# Patient Record
Sex: FEMALE | Race: WHITE | NOT HISPANIC OR LATINO | Employment: FULL TIME | URBAN - METROPOLITAN AREA
[De-identification: names, ages, dates, MRNs, and addresses within clinical notes are randomized per-mention and may not be internally consistent; named-entity substitution may affect disease eponyms.]

---

## 2017-05-23 ENCOUNTER — HOSPITAL ENCOUNTER (OUTPATIENT)
Dept: RADIOLOGY | Facility: MEDICAL CENTER | Age: 57
End: 2017-05-23
Attending: OBSTETRICS & GYNECOLOGY
Payer: COMMERCIAL

## 2017-05-23 DIAGNOSIS — Z12.31 VISIT FOR SCREENING MAMMOGRAM: ICD-10-CM

## 2017-05-23 PROCEDURE — G0202 SCR MAMMO BI INCL CAD: HCPCS

## 2017-05-25 ENCOUNTER — TELEPHONE (OUTPATIENT)
Dept: RADIOLOGY | Facility: MEDICAL CENTER | Age: 57
End: 2017-05-25

## 2017-06-16 ENCOUNTER — HOSPITAL ENCOUNTER (OUTPATIENT)
Dept: RADIOLOGY | Facility: MEDICAL CENTER | Age: 57
End: 2017-06-16
Attending: OBSTETRICS & GYNECOLOGY
Payer: COMMERCIAL

## 2017-06-16 ENCOUNTER — NON-PROVIDER VISIT (OUTPATIENT)
Dept: CARDIOLOGY | Facility: MEDICAL CENTER | Age: 57
End: 2017-06-16
Attending: FAMILY MEDICINE
Payer: COMMERCIAL

## 2017-06-16 VITALS
BODY MASS INDEX: 34.15 KG/M2 | DIASTOLIC BLOOD PRESSURE: 90 MMHG | OXYGEN SATURATION: 94 % | HEART RATE: 73 BPM | SYSTOLIC BLOOD PRESSURE: 120 MMHG | HEIGHT: 64 IN | WEIGHT: 200 LBS

## 2017-06-16 DIAGNOSIS — R53.83 FATIGUE, UNSPECIFIED TYPE: ICD-10-CM

## 2017-06-16 DIAGNOSIS — Z82.49 FAMILY HISTORY OF CARDIOVASCULAR DISEASE: ICD-10-CM

## 2017-06-16 DIAGNOSIS — Z13.6 SCREENING FOR ISCHEMIC HEART DISEASE: ICD-10-CM

## 2017-06-16 DIAGNOSIS — R92.8 MAMMOGRAM ABNORMAL: ICD-10-CM

## 2017-06-16 LAB — TREADMILL STRESS: NORMAL

## 2017-06-16 PROCEDURE — G0206 DX MAMMO INCL CAD UNI: HCPCS | Mod: LT

## 2017-06-16 PROCEDURE — 93015 CV STRESS TEST SUPVJ I&R: CPT | Performed by: INTERNAL MEDICINE

## 2017-06-16 PROCEDURE — 76642 ULTRASOUND BREAST LIMITED: CPT | Mod: LT

## 2017-11-06 ENCOUNTER — TELEPHONE (OUTPATIENT)
Dept: CARDIOLOGY | Facility: MEDICAL CENTER | Age: 57
End: 2017-11-06

## 2017-11-06 NOTE — TELEPHONE ENCOUNTER
Spoke with patient, no recent testing; treadmill done in June and EKG done and in chart; advised to bring medicine and ID and insurance cards to appointment; patient thankful for the phone call.

## 2017-11-08 ENCOUNTER — OFFICE VISIT (OUTPATIENT)
Dept: CARDIOLOGY | Facility: MEDICAL CENTER | Age: 57
End: 2017-11-08
Payer: COMMERCIAL

## 2017-11-08 VITALS
BODY MASS INDEX: 32.78 KG/M2 | SYSTOLIC BLOOD PRESSURE: 126 MMHG | WEIGHT: 192 LBS | DIASTOLIC BLOOD PRESSURE: 80 MMHG | HEIGHT: 64 IN | HEART RATE: 80 BPM | OXYGEN SATURATION: 93 %

## 2017-11-08 DIAGNOSIS — I51.89 DIASTOLIC DYSFUNCTION: ICD-10-CM

## 2017-11-08 DIAGNOSIS — R53.83 FATIGUE, UNSPECIFIED TYPE: ICD-10-CM

## 2017-11-08 PROCEDURE — 99244 OFF/OP CNSLTJ NEW/EST MOD 40: CPT | Performed by: INTERNAL MEDICINE

## 2017-11-08 NOTE — PROGRESS NOTES
Subjective:   Ruthann Lynch is a 57 y.o. female who presents today For consultation regarding fatigue. She has a history of sudden cardiac death in her father and brother. Her brother was in his 40s and her father was in his 70s. She had atypical chest pain culminating in a normal coronary angiogram in 2005 by Dr. Woodall. She complains mostly of generalized fatigue over the past couple of years associated with weight gain. She has no cardiovascular symptoms. She underwent a treadmill stress test with her primary physician that was normal. She achieved good exercise capacity of 10.1 METs. She has no palpitations or other problems. Years ago she saw one of our partners who told her she had diastolic heart failure. She has never in fact had a syndrome of heart failure but did have diastolic dysfunction, grade 2, on her echocardiogram.    Denies any other cardiovascular symptoms including chest pain, shortness of breath, dyspnea on exertion, lightheadedness, syncope or presyncope, lower extremity edema, PND, orthopnea or palpitations.      Past Medical History:   Diagnosis Date   • GERD (gastroesophageal reflux disease)      Past Surgical History:   Procedure Laterality Date   • APPENDECTOMY LAPAROSCOPIC  6/12/08    Performed by NORMA PANTOJA at SURGERY SAME DAY [facility] ORS   • BREAST BIOPSY      hx of benign right breast bx   • LUMPECTOMY     • TONSILLECTOMY       Family History   Problem Relation Age of Onset   • Cancer Mother      History   Smoking Status   • Never Smoker   Smokeless Tobacco   • Never Used     No Known Allergies  Outpatient Encounter Prescriptions as of 11/8/2017   Medication Sig Dispense Refill   • omeprazole (PRILOSEC) 20 MG CPDR Take 20 mg by mouth as needed.       • doxycycline (VIBRAMYCIN) 100 MG TABS Take 1 Tab by mouth 2 times a day. 20 Tab 0   • hydrocod polst-chlorphen polst (TUSSIONEX PENNKINETIC ER) 10-8 MG/5ML LQCR Take 5 mL by mouth every 12 hours. 120 mL 0     No  "facility-administered encounter medications on file as of 11/8/2017.      Review of Systems   All other systems reviewed and are negative.       Objective:   /80   Pulse 80   Ht 1.626 m (5' 4\")   Wt 87.1 kg (192 lb)   SpO2 93%   BMI 32.96 kg/m²     Physical Exam   Constitutional: She is oriented to person, place, and time. She appears well-developed and well-nourished. No distress.   Overweight   HENT:   Head: Normocephalic and atraumatic.   Mouth/Throat: Oropharynx is clear and moist. No oropharyngeal exudate.   Eyes: Conjunctivae are normal. Pupils are equal, round, and reactive to light. No scleral icterus.   Neck: Normal range of motion. Neck supple. No JVD present. No thyromegaly present.   Cardiovascular: Normal rate, regular rhythm, normal heart sounds and intact distal pulses.  Exam reveals no gallop and no friction rub.    No murmur heard.  Pulses:       Carotid pulses are 2+ on the right side, and 2+ on the left side.       Radial pulses are 2+ on the right side, and 2+ on the left side.        Popliteal pulses are 2+ on the right side, and 2+ on the left side.        Dorsalis pedis pulses are 2+ on the right side, and 2+ on the left side.        Posterior tibial pulses are 2+ on the right side, and 2+ on the left side.   Pulmonary/Chest: Effort normal and breath sounds normal. She has no wheezes. She has no rales.   Abdominal: Soft. Bowel sounds are normal. She exhibits no distension. There is no tenderness.   Musculoskeletal: She exhibits no edema or tenderness.   Neurological: She is alert and oriented to person, place, and time. No cranial nerve deficit (Cranial nerves II through XII grossly intact).   Skin: Skin is warm and dry. No rash noted. She is not diaphoretic. No erythema.   Psychiatric: She has a normal mood and affect. Her behavior is normal.   Vitals reviewed.    STRESS (6/17/2017):  Adequate exercise tolerance and heart rate response. No ischemic  changes seen.  PAC's " seen  Negative stress test for ischemia      Assessment:     1. Diastolic dysfunction  ECHOCARDIOGRAM COMP W/O CONT    OVERNIGHT PULSE OXIMETRY    LIPID PROFILE    BTYPE NATRIURETIC PEPTIDE   2. Fatigue, unspecified type  ECHOCARDIOGRAM COMP W/O CONT    OVERNIGHT PULSE OXIMETRY    COMP METABOLIC PANEL    BTYPE NATRIURETIC PEPTIDE    TSH       Medical Decision Making:  Today's Assessment / Status / Plan:     She has no cardiovascular symptoms. She has a family history of sudden death of unclear etiology. Her stress treadmill showed an excellent functional capacity and was normal. She is asymptomatic PACs. She does note that her fatigue is, over the past year or 2 with weight gain and she does not sleep well. She may have developed sleep apnea. We discussed that the grading for diastolic function has changed and she may no longer qualifies having it, and she has never per her report or conversation had the syndrome of heart failure despite what Dr. Farias had told her in the past.    1. Echocardiogram  2. Overnight oximetry  3. Labs including TSH    Should the above be unremarkable recommend routine follow-up as needed only.    Thank you for this interesting consultation. It was my pleasure to see Ruthann Lynch today.    Jatinder Davis MD, FACC, [facility]  Division of Interventional Cardiology  [facility]

## 2017-11-09 ENCOUNTER — TELEPHONE (OUTPATIENT)
Dept: CARDIOLOGY | Facility: MEDICAL CENTER | Age: 57
End: 2017-11-09

## 2017-11-20 ENCOUNTER — HOSPITAL ENCOUNTER (OUTPATIENT)
Dept: CARDIOLOGY | Facility: MEDICAL CENTER | Age: 57
End: 2017-11-20
Attending: INTERNAL MEDICINE
Payer: COMMERCIAL

## 2017-11-20 DIAGNOSIS — I51.89 DIASTOLIC DYSFUNCTION: ICD-10-CM

## 2017-11-20 DIAGNOSIS — R53.83 FATIGUE, UNSPECIFIED TYPE: ICD-10-CM

## 2017-11-20 PROCEDURE — 93306 TTE W/DOPPLER COMPLETE: CPT | Mod: 26 | Performed by: INTERNAL MEDICINE

## 2017-11-20 PROCEDURE — 93306 TTE W/DOPPLER COMPLETE: CPT

## 2017-11-21 ENCOUNTER — TELEPHONE (OUTPATIENT)
Dept: CARDIOLOGY | Facility: MEDICAL CENTER | Age: 57
End: 2017-11-21

## 2017-11-21 DIAGNOSIS — I51.89 DIASTOLIC DYSFUNCTION: ICD-10-CM

## 2017-11-21 DIAGNOSIS — R53.83 FATIGUE, UNSPECIFIED TYPE: ICD-10-CM

## 2017-11-21 LAB
LV EJECT FRACT  99904: 75
LV EJECT FRACT MOD 2C 99903: 75.88
LV EJECT FRACT MOD 4C 99902: 77.45
LV EJECT FRACT MOD BP 99901: 77.5

## 2017-11-21 NOTE — TELEPHONE ENCOUNTER
New referral placed.     referral to Sleep Studies    Message     The referral has been faxed to scheduling      [phone]       ----- Message -----  From: Myranda Persaud  Sent: 11/20/2017   4:22 PM  To: Ashia Tobin R.N.  Subject: Sleep Study Referral                             This patient saw TW and he told her that she needed a sleep study done. There is a referral in her chart from Dr. Mack but she needs a new one.    Thank you

## 2017-11-22 ENCOUNTER — TELEPHONE (OUTPATIENT)
Dept: CARDIOLOGY | Facility: MEDICAL CENTER | Age: 57
End: 2017-11-22

## 2017-11-22 NOTE — TELEPHONE ENCOUNTER
"Pt notified of Echo results as reviewed by Dr. Davis. Questions answered and informed that per TW, \"she no longer meets criteria for having clear-cut diastolic dysfunction\". Pt very appreciative of information. She will call to FU if any issues.     -----------------------------  Echocardiography Laboratory    CONCLUSIONS  Prior Echo - 1/2/13.  Normal left ventricular size, thickness, systolic function.  Left ventricular ejection fraction is visually estimated to be 75%.  Normal regional wall motion.  No significant valve abnormalities.   Right heart pressures are normal.   Compared to the images of the prior study done -  there has been no   significant change.     "

## 2017-11-23 LAB
ALBUMIN SERPL-MCNC: 4.2 G/DL (ref 3.5–5.5)
ALBUMIN/GLOB SERPL: 1.9 {RATIO} (ref 1.2–2.2)
ALP SERPL-CCNC: 79 IU/L (ref 39–117)
ALT SERPL-CCNC: 13 IU/L (ref 0–32)
AST SERPL-CCNC: 16 IU/L (ref 0–40)
BILIRUB SERPL-MCNC: 0.4 MG/DL (ref 0–1.2)
BNP SERPL-MCNC: 29.5 PG/ML (ref 0–100)
BUN SERPL-MCNC: 14 MG/DL (ref 6–24)
BUN/CREAT SERPL: 15 (ref 9–23)
CALCIUM SERPL-MCNC: 9.3 MG/DL (ref 8.7–10.2)
CHLORIDE SERPL-SCNC: 104 MMOL/L (ref 96–106)
CHOLEST SERPL-MCNC: 244 MG/DL (ref 100–199)
CO2 SERPL-SCNC: 25 MMOL/L (ref 18–29)
COMMENT 011824: ABNORMAL
CREAT SERPL-MCNC: 0.95 MG/DL (ref 0.57–1)
GFR SERPLBLD CREATININE-BSD FMLA CKD-EPI: 66 ML/MIN/1.73
GFR SERPLBLD CREATININE-BSD FMLA CKD-EPI: 76 ML/MIN/1.73
GLOBULIN SER CALC-MCNC: 2.2 G/DL (ref 1.5–4.5)
GLUCOSE SERPL-MCNC: 92 MG/DL (ref 65–99)
HDLC SERPL-MCNC: 72 MG/DL
LDLC SERPL CALC-MCNC: 149 MG/DL (ref 0–99)
POTASSIUM SERPL-SCNC: 4.2 MMOL/L (ref 3.5–5.2)
PROT SERPL-MCNC: 6.4 G/DL (ref 6–8.5)
SODIUM SERPL-SCNC: 144 MMOL/L (ref 134–144)
TRIGL SERPL-MCNC: 114 MG/DL (ref 0–149)
TSH SERPL DL<=0.005 MIU/L-ACNC: 1.39 UIU/ML (ref 0.45–4.5)
VLDLC SERPL CALC-MCNC: 23 MG/DL (ref 5–40)

## 2017-12-06 ENCOUNTER — TELEPHONE (OUTPATIENT)
Dept: CARDIOLOGY | Facility: MEDICAL CENTER | Age: 57
End: 2017-12-06

## 2017-12-06 DIAGNOSIS — Z82.49 FAMILY HISTORY OF CARDIOVASCULAR DISEASE: ICD-10-CM

## 2017-12-06 DIAGNOSIS — E78.00 HYPERCHOLESTEREMIA: ICD-10-CM

## 2017-12-06 DIAGNOSIS — G47.30 SLEEP APNEA, UNSPECIFIED TYPE: ICD-10-CM

## 2017-12-07 NOTE — TELEPHONE ENCOUNTER
S/w pt about questions. She just wanted to FU with TSH results and wondering about where referral was sent to. Educated her that TSH is WDL and that referral was placed and sent to [facility]. Informed her to call [phone] to get scheduled. Pt appreciative of information.     Taya Griffith R.N.             [facility]       Patient spoke to you yesterday and says she has another question for you. Please call at your convenience. She can be reached at [phone].

## 2017-12-07 NOTE — TELEPHONE ENCOUNTER
Message     Referral to Pulmonology has been sent to [facility].  If the patient has not been contacted in a timely manner, have them call [phone]     Noted.

## 2017-12-07 NOTE — TELEPHONE ENCOUNTER
S/w pt, she is OK with following up with a pulmonary MD. Educated her that I will place referral for pulmonary and for her to please follow up with use if she does not hear anything in two weeks.     Pt does not want to start Atorvastatin at this time, states she would rather try to just change her diet and life-style at this time. Pt educated on diet changes and informed to please get repeat lipid panel done in 6 months. Pt states understanding and is OK with plan.     Order placed for lipid panel and referral to pulmonary. Lab slip mailed to pt.    MARIAMA WASHBURN    ----- Message from Jatinder Davis M.D. sent at 12/6/2017  2:05 PM PST -----  It appears she may indeed have significant sleep apnea which would be responsible in large part for her fatigue. I recommend also that she initiate low-dose statin therapy with atorvastatin 10 mg daily for her lipid profile in the context of her family history of heart issues.    1. Atorvastatin 10 mg daily  2. Referral to pulmonary medicine for sleep apnea

## 2017-12-19 ENCOUNTER — SLEEP CENTER VISIT (OUTPATIENT)
Dept: SLEEP MEDICINE | Facility: MEDICAL CENTER | Age: 57
End: 2017-12-19
Payer: COMMERCIAL

## 2017-12-19 VITALS
DIASTOLIC BLOOD PRESSURE: 72 MMHG | HEART RATE: 77 BPM | HEIGHT: 64 IN | SYSTOLIC BLOOD PRESSURE: 114 MMHG | RESPIRATION RATE: 16 BRPM | WEIGHT: 193 LBS | BODY MASS INDEX: 32.95 KG/M2 | TEMPERATURE: 97.4 F | OXYGEN SATURATION: 95 %

## 2017-12-19 DIAGNOSIS — G47.10 HYPERSOMNOLENCE: ICD-10-CM

## 2017-12-19 DIAGNOSIS — G47.30 SLEEP APNEA, UNSPECIFIED TYPE: ICD-10-CM

## 2017-12-19 DIAGNOSIS — F51.04 CHRONIC INSOMNIA: ICD-10-CM

## 2017-12-19 PROCEDURE — 99244 OFF/OP CNSLTJ NEW/EST MOD 40: CPT | Performed by: INTERNAL MEDICINE

## 2017-12-19 RX ORDER — VITAMIN E 268 MG
400 CAPSULE ORAL DAILY
COMMUNITY

## 2017-12-19 RX ORDER — BUPROPION HYDROCHLORIDE 150 MG/1
TABLET ORAL
Refills: 3 | COMMUNITY
Start: 2017-09-22 | End: 2017-11-01

## 2017-12-19 RX ORDER — MULTIVITAMIN WITH IRON
TABLET ORAL
COMMUNITY

## 2017-12-19 RX ORDER — TRAZODONE HYDROCHLORIDE 50 MG/1
TABLET ORAL
Refills: 1 | COMMUNITY
Start: 2017-12-04

## 2017-12-19 NOTE — PROGRESS NOTES
CC:  Snoring, non-refreshing sleep and excessive daytime somnolence, suspected sleep apnea hypopnea syndrome.    HPI:   Ms. Lynch is a 57-year-old woman referred by Dr. Jeremias Mack and Dr. Jatinder Jaimes to assist in the evaluation and management of suspected sleep-disordered breathing.    She was referred to cardiology for increasing fatigue over several years. An echocardiogram on November 20, 2017 demonstrated preserved left ventricular systolic function with normal right heart pressures. A treadmill stress test was apparently unremarkable. In the course of her evaluation, symptoms suggesting sleep-disordered breathing were identified.    She has a regular sleep schedule at bedtime at about midnight but requires 2-3 hours to fall asleep. She awakens several times on an average night, once with nocturia, and describes herself as a restless sleeper. She awakens between 8 and 10 in the morning feeling tired, groggy and unsteady on her feet but without morning headaches. She has been told that she does snore but we don't have information on possible cyclic breathing or overt apnea. She is tired and fatigued during the day. She may doze off during quiet moments, or as a passenger in a motor vehicle. She naps when the opportunity presents. She describes significant cognitive deficits and interfere with her work and leisure activities. Her Callender sleepiness score is 10 points. She drinks caffeinated beverages rarely and does not use substances to induce sleep or to maintain wakefulness other than over-the-counter Zquil. She used low-dose trazodone on 2 occasions in the distant past but found that left her groggy during the day. She has not previously been evaluated for sleep problems. She does not have symptoms suggesting narcolepsy, parasomnia or restless leg syndrome. Her father had sleep apnea hypopnea and she suspects that her brother does as well.    Nocturnal oximetry was done in the evening of November 25, 2017.  It demonstrates cyclic drops in saturation to a minimum of 77%. She spent 247 minutes with a saturation below 90%.        Patient Active Problem List    Diagnosis Date Noted   • Atypical chest pain 12/11/2012   • Abnormal ECG 12/11/2012   • GERD (gastroesophageal reflux disease) 12/11/2012   • Family history of cardiovascular disease 12/11/2012   • Obesity 12/11/2012       Past Medical History:   Diagnosis Date   • Chickenpox    • GERD (gastroesophageal reflux disease)    • Montserratian measles    • Influenza    • Mumps    • Nasal drainage    • Peptic ulcer    • Tonsillitis        Past Surgical History:   Procedure Laterality Date   • APPENDECTOMY LAPAROSCOPIC  6/12/08    Performed by NORMA PANTOJA at SURGERY SAME DAY [facility] ORS   • APPENDECTOMY     • BREAST BIOPSY      hx of benign right breast bx   • LUMPECTOMY     • TONSILLECTOMY     • TONSILLECTOMY         Family History   Problem Relation Age of Onset   • Cancer Mother    • Sleep Apnea Father    • Heart Attack Father    • Heart Attack Brother        Social History     Social History   • Marital status:      Spouse name: N/A   • Number of children: N/A   • Years of education: N/A     Occupational History   • Not on file.     Social History Main Topics   • Smoking status: Passive Smoke Exposure - Never Smoker   • Smokeless tobacco: Never Used   • Alcohol use Yes      Comment: Rarely   • Drug use: No   • Sexual activity: Not on file     Other Topics Concern   • Not on file     Social History Narrative    ** Merged History Encounter **            Current Outpatient Prescriptions   Medication Sig Dispense Refill   • Multiple Vitamins-Minerals (CENTRUM SILVER 50+WOMEN) Tab Take  by mouth.     • Magnesium 250 MG Tab Take  by mouth.     • vitamin e (VITAMIN E) 400 UNIT Cap Take 400 Units by mouth every day.     • omeprazole (PRILOSEC) 20 MG CPDR Take 20 mg by mouth as needed.       • trazodone (DESYREL) 50 MG Tab TAKE 1 ORAL TABLET EVERY EVENING  1   •  "doxycycline (VIBRAMYCIN) 100 MG TABS Take 1 Tab by mouth 2 times a day. (Patient not taking: Reported on 12/19/2017) 20 Tab 0   • hydrocod polst-chlorphen polst (TUSSIONEX PENNKINETIC ER) 10-8 MG/5ML LQCR Take 5 mL by mouth every 12 hours. (Patient not taking: Reported on 12/19/2017) 120 mL 0     No current facility-administered medications for this visit.     \"CURRENT RX\"      Allergies: Patient has no known allergies.      ROS  Positive for the sleep and GI issues reviewed above as well as the items in the problem list and past medical history. She wears corrective lenses but has no diplopia. She has episodic rhinitis without tinnitus. She has a history of heartburn without melena or hematochezia. She denies chest pain or palpitations, unusual dyspnea or wheezing. She does have some low back pain. All other aspects of the CPT review of systems process are negative as documented in the attached self history form.      Physical Exam:   /72   Pulse 77   Temp 36.3 °C (97.4 °F)   Resp 16   Ht 1.626 m (5' 4\")   Wt 87.5 kg (193 lb)   SpO2 95%   BMI 33.13 kg/m²    Head and neck examination demonstrates no mucosal lesion, purulent drainage or evident polyps. The pharynx is benign with a Mallampati III presentation. The neck is supple without thyromegaly. On chest examination there are symmetrical bilateral breath sounds without rales, wheezing or consolidation. On cardiac examination, the apical impulse and heart sounds are normal and the rhythm is regular. There is no murmur, gallop or rub and no jugular venous distention. The abdomen is soft with active bowel sounds and no palpable hepatosplenomegaly, mass, guarding or rebound. The extremities show no clubbing, cyanosis or edema and no signs of deep venous thrombosis. There is no warmth, redness, tenderness or palpable venous cord in the calves. The skin is clear, warm and dry. There is no unusual peripheral lymphadenopathy. Peripheral pulses are palpable in " all 4 extremities. On neurologic examination, cranial nerve function is intact, motor tone is symmetrical, and the patient is alert, oriented and responsive.       Problems:  1. Sleep apnea, unspecified type  She presents with snoring, non-refreshing sleep and excessive daytime somnolence. She has a carotid posterior pharyngeal airway and a body mass index of 33.  The clinical probability of sleep apnea hypopnea syndrome is significant. Accurate diagnosis and effective treatment are required not only to improve levels of daytime alertness but also to reduce the cardiac and neurologic risks associated with untreated sleep apnea. We have discussed diagnostic options including in-laboratory, attended polysomnography and home sleep testing. We have also discussed treatment options including airway pressurization, reconstructive otolaryngologic surgery, dental appliances and weight management.    2. Hypersomnolence  Probably related in part to the suspected sleep-disordered breathing. She has been sufficient time in bed but has significant chronic insomnia that may limit her actual sleep time. He does not seem to have issues with sleep hygiene.    3. Chronic insomnia  With both sleep onset and sleep maintenance elements. This will be reevaluated after her presumed sleep-disordered breathing problems have been assessed and effectively treated.      Plan:   1. Sleep study. In an effort to speed her diagnostic evaluation we will begin with a home sleep test. Her chronic insomnia may make assessment of the home sleep test unreliable. If the home sleep test is negative I would recommend a formal, in laboratory polysomnogram given the high clinical probability of obstructive sleep apnea hypopnea. We will tentatively make an appointment for the in-house sleep study which would also be useful for titration therapy if the home sleep test is positive.    2. Return visit after testing to review results and treatment options.    We  appreciate the opportunity to assist in her care.

## 2017-12-28 ENCOUNTER — TELEPHONE (OUTPATIENT)
Dept: PULMONOLOGY | Facility: HOSPICE | Age: 57
End: 2017-12-28

## 2017-12-28 NOTE — TELEPHONE ENCOUNTER
[facility] in lab sleep study, states she needs to do a HST first. Please sign for pended

## 2017-12-29 NOTE — TELEPHONE ENCOUNTER
HST order was expected and is already on file from 12/19/17 (signed by Dr. Smith).    Called patient, left voicemail to discuss in-lab sleep study denial and then we can schedule the home study.

## 2018-01-02 ENCOUNTER — HOME STUDY (OUTPATIENT)
Dept: SLEEP MEDICINE | Facility: MEDICAL CENTER | Age: 58
End: 2018-01-02
Attending: INTERNAL MEDICINE
Payer: COMMERCIAL

## 2018-01-02 DIAGNOSIS — G47.30 SLEEP APNEA, UNSPECIFIED TYPE: ICD-10-CM

## 2018-01-02 DIAGNOSIS — G47.10 HYPERSOMNOLENCE: ICD-10-CM

## 2018-01-02 PROCEDURE — 95806 SLEEP STUDY UNATT&RESP EFFT: CPT | Performed by: FAMILY MEDICINE

## 2018-01-04 NOTE — PROCEDURES
DIAGNOSTIC HOME SLEEP TEST (HST) REPORT       PATIENT ID:  NAME:  Ruthann Lynch  MRN:               [id number]  YOB: 1960  DATE OF STUDY:1/2/18      Impression:     This study shows evidence of:    1. Mild obstructive sleep apnea with  Respiratory Event Index (GARDENAI) of 8.7 per hour and worse in supine sleep with GARDENIA at 12.6/hr . These findings are based on the recording time (flow evaluation time). It is not possible with this device to determine a traditional apnea+hypopnea index (AHI) for total sleep time since EEG channels are not available.   2. Nocturnal hypoxia: O2 Sat. evangelista was 81% and mean O2 sat was *88% and baseline O2 at 92%. O2 sat was below 88% for *53% of the flow evaluation time. Oxygen Desaturation (>=3%) Index was elevated at 8.8/hr.       TECHNICAL DESCRIPTION:  M86 Security Device used was a type-III home study device. Home sleep study recording included: Airflow recording by nasal pressure transducer; Respiratory Effort by abdominal Respiratory Bands; O2 by finger oximetry. A position sensor and a snore channel was also used.    Scoring Criteria: A modification of the the AASM Manual for the Scoring of Sleep and Associated Events, 2012, was used.   Obstructive apnea was scored by cessation of airflow for at least 10 seconds with continuing respiratory effort.  Central apnea was scored by cessation of airflow for at least 10 seconds with no effort.  Hypopnea was scored by a 30% or more reduction in airflow for at least 10 seconds accompanied by an arterial oxygen desaturation of 3% or more.  (For Medicare patients, hypopneas were scored by a 30% or more reduction in airflow for at least 10 seconds accompanied by an arterial oxygen saturation of 4% or more, as required by their insurance, CMS.      General sleep summary: . Total recording time is 8 hours and 42 minutes and total flow evaluation time is 8 hours and 30 minutes. The patient spent 4 hours and 17 minutes in the supine  position and 4 hours and 13 minutes in the nonsupine position.    Respiratory events:    Apneas: 0 (Obstructive apnea index 0/hr, Central apnea index 0 /hr, mixed 0 /hour)  Hypopneas: 74    Recommendations:    1. CPAP titration study.   2.   In general patients with sleep apnea are advised to avoid alcohol and sedatives and to not operate a motor vehicle while drowsy. In some cases alternative treatment options may prove effective in resolving sleep apnea in these options include upper airway surgery, the use of a dental orthotic or weight loss and positional therapy. Clinical correlation is required.    Samreen Lehman MD

## 2018-01-05 ENCOUNTER — TELEPHONE (OUTPATIENT)
Dept: SLEEP MEDICINE | Facility: MEDICAL CENTER | Age: 58
End: 2018-01-05

## 2018-01-05 ENCOUNTER — SLEEP CENTER VISIT (OUTPATIENT)
Dept: SLEEP MEDICINE | Facility: MEDICAL CENTER | Age: 58
End: 2018-01-05
Payer: COMMERCIAL

## 2018-01-05 VITALS
WEIGHT: 195 LBS | HEART RATE: 74 BPM | HEIGHT: 64 IN | OXYGEN SATURATION: 94 % | DIASTOLIC BLOOD PRESSURE: 84 MMHG | BODY MASS INDEX: 33.29 KG/M2 | SYSTOLIC BLOOD PRESSURE: 122 MMHG | TEMPERATURE: 97.8 F | RESPIRATION RATE: 16 BRPM

## 2018-01-05 DIAGNOSIS — G47.33 OBSTRUCTIVE SLEEP APNEA SYNDROME: ICD-10-CM

## 2018-01-05 DIAGNOSIS — F51.04 CHRONIC INSOMNIA: ICD-10-CM

## 2018-01-05 DIAGNOSIS — G47.10 HYPERSOMNOLENCE: ICD-10-CM

## 2018-01-05 PROCEDURE — 99214 OFFICE O/P EST MOD 30 MIN: CPT | Performed by: INTERNAL MEDICINE

## 2018-01-05 RX ORDER — ZOLPIDEM TARTRATE 5 MG/1
5 TABLET ORAL NIGHTLY PRN
Qty: 30 TAB | Refills: 0 | Status: SHIPPED | OUTPATIENT
Start: 2018-01-05 | End: 2018-03-04

## 2018-01-06 NOTE — TELEPHONE ENCOUNTER
Ambien RX printed on RX paper, but not given to patient at check-out today.  Called in detailed [facility] @ [facility] [address]    Patient notified.    [facility] #[id number] - [place], [place] - [address]  [address] [facility]  [place] [address]  Phone: [phone] Fax: [phone]

## 2018-02-26 ENCOUNTER — SLEEP CENTER VISIT (OUTPATIENT)
Dept: SLEEP MEDICINE | Facility: MEDICAL CENTER | Age: 58
End: 2018-02-26
Payer: COMMERCIAL

## 2018-02-26 VITALS
OXYGEN SATURATION: 95 % | SYSTOLIC BLOOD PRESSURE: 110 MMHG | BODY MASS INDEX: 34.01 KG/M2 | WEIGHT: 199.2 LBS | HEART RATE: 72 BPM | DIASTOLIC BLOOD PRESSURE: 62 MMHG | RESPIRATION RATE: 16 BRPM | HEIGHT: 64 IN | TEMPERATURE: 97.7 F

## 2018-02-26 DIAGNOSIS — G47.10 HYPERSOMNOLENCE: ICD-10-CM

## 2018-02-26 DIAGNOSIS — G47.33 OBSTRUCTIVE SLEEP APNEA SYNDROME: ICD-10-CM

## 2018-02-26 DIAGNOSIS — F51.04 CHRONIC INSOMNIA: ICD-10-CM

## 2018-02-26 PROCEDURE — 99214 OFFICE O/P EST MOD 30 MIN: CPT | Performed by: INTERNAL MEDICINE

## 2018-02-26 RX ORDER — ZOLPIDEM TARTRATE 5 MG/1
5 TABLET ORAL NIGHTLY PRN
Qty: 30 TAB | Refills: 2 | Status: SHIPPED | OUTPATIENT
Start: 2018-02-26 | End: 2018-03-28

## 2018-02-26 NOTE — PROGRESS NOTES
CC:  Snoring, non-refreshing sleep and excessive daytime somnolence, suspected sleep apnea hypopnea syndrome.     HPI:   Ms. Lynch is a 57-year-old woman referred by Dr. Jeremias Mack and Dr. Jatinder Jaimes to assist in the evaluation and management of suspected sleep-disordered breathing. She was initially evaluated on January 5, 2018 and returns now to evaluate her response to auto titrating CPAP therapy.     She was referred to cardiology for increasing fatigue over several years. An echocardiogram on November 20, 2017 demonstrated preserved left ventricular systolic function with normal right heart pressures. A treadmill stress test was apparently unremarkable. In the course of her evaluation, symptoms suggesting sleep-disordered breathing were identified.     She has a regular sleep schedule at bedtime at about midnight but requires 2-3 hours to fall asleep. She awakens several times on an average night, once with nocturia, and describes herself as a restless sleeper. She awakens between 8 and 10 in the morning feeling tired, groggy and unsteady on her feet but without morning headaches. She has been told that she does snore but we don't have information on possible cyclic breathing or overt apnea. She is tired and fatigued during the day. She may doze off during quiet moments, or as a passenger in a motor vehicle. She naps when the opportunity presents. She describes significant cognitive deficits and interfere with her work and leisure activities. Her [place] sleepiness score is 10 points. She drinks caffeinated beverages rarely and does not use substances to induce sleep or to maintain wakefulness other than over-the-counter Zquil. She used low-dose trazodone on 2 occasions in the distant past but found that left her groggy during the day. She has not previously been evaluated for sleep problems. She does not have symptoms suggesting narcolepsy, parasomnia or restless leg syndrome. Her father had sleep apnea  hypopnea and she suspects that her brother does as well.     Nocturnal oximetry was done in the evening of November 25, 2017. It demonstrates cyclic drops in saturation to a minimum of 77%. She spent 247 minutes with a saturation below 90%.     A home sleep test on January 2, 2018 demonstrated an apnea hypopnea index of 8.7 events per hour with a lowest arterial oxygen saturation of 75% on room air.  She spent 53% of the study time, or 4.5 hours in total, with a saturation less than 89%.  Intermittent snoring was identified and the heart rate ranged from 60 to 108 bpm. she was started titrating CPAP with a pressure range of 5-12 cm water.    She is acclimating well to the CPAP, using each night, throughout the night. She is comfortable with the Dreamware mask but was resupplied with nasal pillows that she does not like. She is feeling better during the day with marked increase in daytime alertness and function.    The CPAP data recording chip information is reviewed with the patient. It demonstrates use in each of the last 30 days with an average daily usage of 8 hours and 25 minutes. The mean pressure was 5.6 cm water with a 90% maximum of 6.8. Leak is minimal and the estimated residual apnea hypopnea index is 1.4 events per hour.        Patient Active Problem List    Diagnosis Date Noted   • Atypical chest pain 12/11/2012   • Abnormal ECG 12/11/2012   • GERD (gastroesophageal reflux disease) 12/11/2012   • Family history of cardiovascular disease 12/11/2012   • Obesity 12/11/2012       Past Medical History:   Diagnosis Date   • Chickenpox    • GERD (gastroesophageal reflux disease)    • [place] measles    • Influenza    • Mumps    • Nasal drainage    • Peptic ulcer    • Tonsillitis        Past Surgical History:   Procedure Laterality Date   • APPENDECTOMY LAPAROSCOPIC  6/12/08    Performed by NORMA PANTOJA at SURGERY SAME DAY [facility]   • APPENDECTOMY     • BREAST BIOPSY      hx of benign right breast bx   •  "LUMPECTOMY     • TONSILLECTOMY     • TONSILLECTOMY         Family History   Problem Relation Age of Onset   • Cancer Mother    • Sleep Apnea Father    • Heart Attack Father    • Heart Attack Brother        Social History     Social History   • Marital status:      Spouse name: N/A   • Number of children: N/A   • Years of education: N/A     Occupational History   • Not on file.     Social History Main Topics   • Smoking status: Passive Smoke Exposure - Never Smoker   • Smokeless tobacco: Never Used   • Alcohol use 0.0 - 0.6 oz/week      Comment: Rarely   • Drug use: No   • Sexual activity: Not on file     Other Topics Concern   • Not on file     Social History Narrative    ** Merged History Encounter **            Current Outpatient Prescriptions   Medication Sig Dispense Refill   • zolpidem (AMBIEN) 5 MG Tab Take 1 Tab by mouth at bedtime as needed for Sleep for up to 58 days. 30 Tab 0   • vitamin e (VITAMIN E) 400 UNIT Cap Take 400 Units by mouth every day.     • trazodone (DESYREL) 50 MG Tab TAKE 1 ORAL TABLET EVERY EVENING  1   • Multiple Vitamins-Minerals (CENTRUM SILVER 50+WOMEN) Tab Take  by mouth.     • Magnesium 250 MG Tab Take  by mouth.     • doxycycline (VIBRAMYCIN) 100 MG TABS Take 1 Tab by mouth 2 times a day. (Patient not taking: Reported on 12/19/2017) 20 Tab 0   • hydrocod polst-chlorphen polst (TUSSIONEX PENNKINETIC ER) 10-8 MG/5ML LQCR Take 5 mL by mouth every 12 hours. (Patient not taking: Reported on 12/19/2017) 120 mL 0   • omeprazole (PRILOSEC) 20 MG CPDR Take 20 mg by mouth as needed.         No current facility-administered medications for this visit.     \"CURRENT RX\"      Allergies: Patient has no known allergies.      ROS  Unchanged from prior and positive for the sleep and GI issues reviewed above as well as the items in the problem list and past medical history. She wears corrective lenses but has no diplopia. She has episodic rhinitis without tinnitus. She has a history of " "heartburn without melena or hematochezia. She denies chest pain or palpitations, unusual dyspnea or wheezing. She does have some low back pain. All other aspects of the CPT review of systems process are negative as documented in the attached self history form.      Physical Exam:   /62   Pulse 72   Temp 36.5 °C (97.7 °F)   Resp 16   Ht 1.626 m (5' 4\")   Wt 90.4 kg (199 lb 3.2 oz)   SpO2 95%   BMI 34.19 kg/m²    Head and neck examination demonstrates no mucosal lesion, purulent drainage or evident polyps. The pharynx is benign with a Mallampati III presentation. The neck is supple without thyromegaly. On chest examination there are symmetrical bilateral breath sounds without rales, wheezing or consolidation. On cardiac examination, the apical impulse and heart sounds are normal and the rhythm is regular. There is no murmur, gallop or rub and no jugular venous distention. The abdomen is soft with active bowel sounds and no palpable hepatosplenomegaly, mass, guarding or rebound. The extremities show no clubbing, cyanosis or edema and no signs of deep venous thrombosis. There is no warmth, redness, tenderness or palpable venous cord in the calves. The skin is clear, warm and dry. There is no unusual peripheral lymphadenopathy. Peripheral pulses are palpable in all 4 extremities. On neurologic examination, cranial nerve function is intact, motor tone is symmetrical, and the patient is alert, oriented and responsive.       Problems:  1. Obstructive sleep apnea syndrome  She has significant obstructive sleep apnea hypopnea, now successfully treated with auto titrating CPAP. She is using the CPAP regularly, as confirmed by the data recording chip. She enjoys marked improvement in daytime alertness. The chip suggests a low residual apnea hypopnea index.    2. Chronic insomnia  With both sleep onset and sleep maintenance elements. This has been more of a problem that she adjusts to the use of the nocturnal CPAP. " We have discussed the risks and benefits of continued use of low-dose zolpidem. We talked about potential problems with habituation, daytime grogginess and nocturnal behavior issues. On balance I think that the benefit of that drug a low dose over the next month or 2 would be appropriate as she adjusts to the CPAP therapy. Over the longer term she may be a candidate for cognitive behavioral therapy.    3. Hypersomnolence  Improved with effective treatment for sleep disordered breathing.      Plan:   1. Continue auto titrating CPAP at 5-15 cm water with the Dreamware nasal mask.    2. Zolpidem 5 mg nightly as needed for insomnia. We have talked about trying to limit the use of that medication to 4 nights a week or less.    3. Return visit here in 6 months, or sooner if new problems develop.    We appreciate the opportunity to assist in her care.

## 2018-08-27 ENCOUNTER — APPOINTMENT (OUTPATIENT)
Dept: SLEEP MEDICINE | Facility: MEDICAL CENTER | Age: 58
End: 2018-08-27
Payer: COMMERCIAL

## 2020-01-08 ENCOUNTER — APPOINTMENT (RX ONLY)
Dept: URBAN - METROPOLITAN AREA CLINIC 36 | Facility: CLINIC | Age: 60
Setting detail: DERMATOLOGY
End: 2020-01-08